# Patient Record
Sex: FEMALE | Race: WHITE | NOT HISPANIC OR LATINO
[De-identification: names, ages, dates, MRNs, and addresses within clinical notes are randomized per-mention and may not be internally consistent; named-entity substitution may affect disease eponyms.]

---

## 2018-02-09 ENCOUNTER — RESULT REVIEW (OUTPATIENT)
Age: 63
End: 2018-02-09

## 2019-04-09 ENCOUNTER — RESULT REVIEW (OUTPATIENT)
Age: 64
End: 2019-04-09

## 2020-05-19 ENCOUNTER — RESULT REVIEW (OUTPATIENT)
Age: 65
End: 2020-05-19

## 2021-06-08 ENCOUNTER — RESULT REVIEW (OUTPATIENT)
Age: 66
End: 2021-06-08

## 2022-06-21 ENCOUNTER — APPOINTMENT (OUTPATIENT)
Dept: OBGYN | Facility: CLINIC | Age: 67
End: 2022-06-21

## 2023-01-09 ENCOUNTER — APPOINTMENT (OUTPATIENT)
Dept: OBGYN | Facility: CLINIC | Age: 68
End: 2023-01-09
Payer: MEDICARE

## 2023-01-09 PROCEDURE — 81002 URINALYSIS NONAUTO W/O SCOPE: CPT

## 2023-01-09 PROCEDURE — 76830 TRANSVAGINAL US NON-OB: CPT

## 2023-01-09 PROCEDURE — 99213 OFFICE O/P EST LOW 20 MIN: CPT | Mod: 25

## 2023-01-09 PROCEDURE — 82270 OCCULT BLOOD FECES: CPT

## 2023-01-09 PROCEDURE — G0101: CPT

## 2024-01-26 PROBLEM — Z00.00 ENCOUNTER FOR PREVENTIVE HEALTH EXAMINATION: Status: ACTIVE | Noted: 2024-01-26

## 2024-02-08 NOTE — REASON FOR VISIT
[Initial Visit ___] : an initial visit for [unfilled] [Questionnaire Received] : Patient questionnaire received [Intake Form Reviewed] : Patient intake form with past medical history, surgical history, family history and social history reviewed today [Urinary Incontinence] : urinary incontinence [Recurrent Urinary Infections] : recurrent urinary infections

## 2024-02-08 NOTE — PHYSICAL EXAM
[Chaperone Present] : A chaperone was present in the examining room during all aspects of the physical examination [No Acute Distress] : in no acute distress [Well developed] : well developed [Well Nourished] : ~L well nourished [Oriented x3] : oriented to person, place, and time [Cough] : no cough [No Edema] : ~T edema was not present [Tenderness] : ~T no ~M abdominal tenderness observed [Distended] : not distended [Warm and Dry] : was warm and dry to touch

## 2024-02-08 NOTE — ADDENDUM
[FreeTextEntry1] : This note was written by Viridiana Romeo, acting as the  for Dr. Philip. This note accurately reflects the work and decisions made by Dr. Philip.

## 2024-02-08 NOTE — HISTORY OF PRESENT ILLNESS
[FreeTextEntry1] : STACY is a 68 year female who presents for ANDREI/UTI.   She was referred by Lynnette Jay.    POB: GYN: PMH: PSH: Meds: Allx:     Daytime frequency: Nocturia: Urinary urgency: Leakage of urine with urgency: Leakage of urine with coughing sneezing laughing: Yes  Incontinence pad use: Sensation of incomplete bladder emptying: History of frequent urinary tract infections: Yes  History of hematuria: Previous treatment: Vaginal symptoms: Bowel symptoms:

## 2024-02-13 ENCOUNTER — APPOINTMENT (OUTPATIENT)
Dept: UROGYNECOLOGY | Facility: CLINIC | Age: 69
End: 2024-02-13

## 2024-06-18 ENCOUNTER — APPOINTMENT (OUTPATIENT)
Dept: OBGYN | Facility: CLINIC | Age: 69
End: 2024-06-18